# Patient Record
Sex: FEMALE
[De-identification: names, ages, dates, MRNs, and addresses within clinical notes are randomized per-mention and may not be internally consistent; named-entity substitution may affect disease eponyms.]

---

## 2019-03-14 ENCOUNTER — HOSPITAL ENCOUNTER (OUTPATIENT)
Dept: HOSPITAL 14 - H.ER | Age: 29
Setting detail: OBSERVATION
LOS: 2 days | Discharge: HOME | End: 2019-03-16
Attending: INTERNAL MEDICINE | Admitting: INTERNAL MEDICINE
Payer: COMMERCIAL

## 2019-03-14 DIAGNOSIS — F32.9: ICD-10-CM

## 2019-03-14 DIAGNOSIS — F41.9: ICD-10-CM

## 2019-03-14 DIAGNOSIS — F14.90: ICD-10-CM

## 2019-03-14 DIAGNOSIS — L03.114: Primary | ICD-10-CM

## 2019-03-14 DIAGNOSIS — L29.9: ICD-10-CM

## 2019-03-14 DIAGNOSIS — R21: ICD-10-CM

## 2019-03-14 LAB
BASOPHILS # BLD AUTO: 0 K/UL (ref 0–0.2)
BASOPHILS NFR BLD: 0.2 % (ref 0–2)
BUN SERPL-MCNC: 11 MG/DL (ref 7–17)
CALCIUM SERPL-MCNC: 9.9 MG/DL (ref 8.4–10.2)
EOSINOPHIL # BLD AUTO: 0.1 K/UL (ref 0–0.7)
EOSINOPHIL NFR BLD: 1.4 % (ref 0–4)
ERYTHROCYTE [DISTWIDTH] IN BLOOD BY AUTOMATED COUNT: 13.2 % (ref 11.5–14.5)
GFR NON-AFRICAN AMERICAN: > 60
HGB BLD-MCNC: 12.8 G/DL (ref 12–16)
LYMPHOCYTES # BLD AUTO: 1.5 K/UL (ref 1–4.3)
LYMPHOCYTES NFR BLD AUTO: 14.2 % (ref 20–40)
MCH RBC QN AUTO: 28.7 PG (ref 27–31)
MCHC RBC AUTO-ENTMCNC: 32.9 G/DL (ref 33–37)
MCV RBC AUTO: 87.4 FL (ref 81–99)
MONOCYTES # BLD: 0.7 K/UL (ref 0–0.8)
MONOCYTES NFR BLD: 6.9 % (ref 0–10)
NEUTROPHILS # BLD: 7.9 K/UL (ref 1.8–7)
NEUTROPHILS NFR BLD AUTO: 77.3 % (ref 50–75)
NRBC BLD AUTO-RTO: 0 % (ref 0–0)
PLATELET # BLD: 268 K/UL (ref 130–400)
PMV BLD AUTO: 7.1 FL (ref 7.2–11.7)
RBC # BLD AUTO: 4.46 MIL/UL (ref 3.8–5.2)
WBC # BLD AUTO: 10.2 K/UL (ref 4.8–10.8)

## 2019-03-14 PROCEDURE — 87040 BLOOD CULTURE FOR BACTERIA: CPT

## 2019-03-14 PROCEDURE — 36415 COLL VENOUS BLD VENIPUNCTURE: CPT

## 2019-03-14 PROCEDURE — 85651 RBC SED RATE NONAUTOMATED: CPT

## 2019-03-14 PROCEDURE — 86003 ALLG SPEC IGE CRUDE XTRC EA: CPT

## 2019-03-14 PROCEDURE — 81025 URINE PREGNANCY TEST: CPT

## 2019-03-14 PROCEDURE — 87070 CULTURE OTHR SPECIMN AEROBIC: CPT

## 2019-03-14 PROCEDURE — 85025 COMPLETE CBC W/AUTO DIFF WBC: CPT

## 2019-03-14 PROCEDURE — 99284 EMERGENCY DEPT VISIT MOD MDM: CPT

## 2019-03-14 PROCEDURE — 96374 THER/PROPH/DIAG INJ IV PUSH: CPT

## 2019-03-14 PROCEDURE — 82785 ASSAY OF IGE: CPT

## 2019-03-14 PROCEDURE — 80048 BASIC METABOLIC PNL TOTAL CA: CPT

## 2019-03-14 NOTE — ED PDOC
HPI: Skin/Bite Injury


Time Seen by Provider: 03/14/19 22:03


Chief Complaint (Nursing): Abnormal Skin Integrity


Chief Complaint (Provider): rash


History Per: Patient


History/Exam Limitations: no limitations


Additional Complaint(s): 


29 y/o F with hx of ADD and anxiety/depression who presents with worsening 

redness of left arm. Pt states that 2 days ago had hives on left arm that spread

to legs and abdomen. It was itchy so she began to scratch and then it began to 

blister. She developed redness so she went to urgent care today. At the urgent 

care, the blisters were scrubbed with hydrogen peroxide and cleaned with Mup

irocin antibiotic ointment. She was given a dose of Doxycycline, had redness 

marked and was advised to go to ER if redness passed the lines made on her skin.

Since then, redness has progressed and she now feels that her left arm is 

swollen. Denies eating new foods or medications, numbness, fever, chills, night 

sweats. She took a Benadryl earlier today with no effect. 





Past Medical History


Reviewed: Historical Data, Nursing Documentation, Vital Signs


Vital Signs: 





                                Last Vital Signs











Temp  98.9 F   03/14/19 22:14


 


Pulse  88   03/14/19 22:14


 


Resp  18   03/14/19 22:14


 


BP  120/85   03/14/19 22:14


 


Pulse Ox  98   03/14/19 22:14














- Medical History


PMH: Anxiety, Depression (ADD)





- Surgical History


Surgical History: No Surg Hx





- Family History


Family History: States: Unknown Family Hx





- Allergies


Allergies/Adverse Reactions: 


                                    Allergies











Allergy/AdvReac Type Severity Reaction Status Date / Time


 


No Known Allergies Allergy   Verified 03/14/19 22:14














Review of Systems


Constitutional: Negative for: Fever, Chills


Gastrointestinal: Negative for: Nausea, Vomiting





Physical Exam





- Reviewed


Nursing Documentation Reviewed: Yes


Vital Signs Reviewed: Yes





- Physical Exam


Appears: Positive for: Non-toxic


Skin: Positive for: Rash (left anterior forearm with abrasion with associated 

redness tracking up arm to past elbow medially with increased warmth and 

swelling, 2nd abrasion superior to that with associated erythema and increased 

warmth. abrasion to lower mid abdomen with mild associated erythema. Right lower

lateral leg with abrasion and mild associated erythema, Right lateral knee with 

abrasion and mild associated erythema. No purulent drainage noted from any 

wounds)


ENT: Positive for: Normal ENT Inspection


Pulses-Radial (L): 1+


Extremity: Positive for: Normal ROM (flexion and extension of left wrist and all

5 digits.), Capillary Refill (< 2sec)


Neurological/Psych: Positive for: Awake, Alert, Oriented





- Laboratory Results


Result Diagrams: 


                                 03/14/19 22:49





                                 03/14/19 22:49





- ECG


O2 Sat by Pulse Oximetry: 98





Medical Decision Making


Medical Decision Making: 


CBC, BMP, blood cultures


Urine preg


Solu-Medrol 125mg IV x 1


Benadryl 50mg IV x 1





23:30: re-assessed (30min into Solu-Medrol). Patient feels that symptoms are 

unchanged and that redness may have progressed further. Left forearm erythema 

extending into left palmar aspect of hand. No change in swelling. Labs 

unremarkable. Zosyn 3.375G IV x 1 ordered. 





23:40: Patient endorsed to [facility] Jorge pending re-evaluation.





Disposition





- Clinical Impression


Clinical Impression: 


 Cellulitis








- Patient ED Disposition


Is Patient to be Admitted: Transfer of Care (Remi Jorge)


Counseled Patient/Family Regarding: Studies Performed, Diagnosis, Need For 

Followup





- Disposition


Disposition: Transfer of Care


Disposition Time: 23:46


Condition: FAIR


Forms:  [place] (English)

## 2019-03-15 RX ADMIN — Medication SCH APPLIC: at 17:13

## 2019-03-15 RX ADMIN — METHYLPREDNISOLONE SODIUM SUCCINATE SCH MG: 40 INJECTION, POWDER, FOR SOLUTION INTRAMUSCULAR; INTRAVENOUS at 10:19

## 2019-03-15 RX ADMIN — Medication SCH APPLIC: at 20:29
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
## 2019-03-15 NOTE — ED PDOC
- Laboratory Results


Result Diagrams: 


                                 03/14/19 22:49





                                 03/14/19 22:49





- ECG


O2 Sat by Pulse Oximetry: 99





- Progress


ED Course And Treament: 





Case endorsed to writer from [facility] pending re-eval after IV Zosyn, IV 

solumedrol, IV bendaryl





2:00


Patient resting comfortably; reports redness/itching/"tightness" in left forearm

still the same.  Redness expanding outside marked areas from today's Urgent Care

visit; has not receded since PO Doxycycline dose at 18:00 and IV zosyn dose at 

23:00





Patient evaluated by Dr. Mcfadden, will admit for observation for subsequent IV 

antibiotics


Dr. Mcfadden discussed case with Dr. Madera, medical service on-call











<Greta Jorge - Last Filed: 03/15/19 02:25>





- Laboratory Results


Result Diagrams: 


                                 03/14/19 22:49





                                 03/14/19 22:49





<Efren Mcfadden - Last Filed: 03/18/19 10:47>





Disposition





- POA


Present On Arrival: None





- Disposition


Disposition: Hospitalized as Observation Patient


Disposition Time: 02:00





<Greta Jorge - Last Filed: 03/15/19 02:25>





<Efren Mcfadden - Last Filed: 03/18/19 10:47>





- Clinical Impression


Clinical Impression: 


 Cellulitis








- Disposition


Condition: FAIR
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
## 2019-03-16 VITALS — HEART RATE: 77 BPM

## 2019-03-16 VITALS
SYSTOLIC BLOOD PRESSURE: 95 MMHG | RESPIRATION RATE: 20 BRPM | OXYGEN SATURATION: 98 % | DIASTOLIC BLOOD PRESSURE: 60 MMHG | TEMPERATURE: 97.8 F

## 2019-03-16 LAB
EGG WHITE IGE QN: <0.1 KU/L (ref ?–0.1)
SHRIMP IGE QN: <0.1 KU/L (ref ?–0.1)

## 2019-03-16 RX ADMIN — METHYLPREDNISOLONE SODIUM SUCCINATE SCH MG: 40 INJECTION, POWDER, FOR SOLUTION INTRAMUSCULAR; INTRAVENOUS at 08:48

## 2019-03-16 RX ADMIN — METHYLPREDNISOLONE SODIUM SUCCINATE SCH MG: 40 INJECTION, POWDER, FOR SOLUTION INTRAMUSCULAR; INTRAVENOUS at 08:55

## 2019-03-16 RX ADMIN — Medication SCH APPLIC: at 08:49

## 2019-03-16 NOTE — CP.PCM.DIS
Provider





- Provider


Date of Admission: 


03/15/19 02:27





Attending physician: 


Boone Madera MD





Consults: 








03/15/19 09:12


Infectious Disease Consult Routine 


   Comment: 


   Consulting Provider: Nabor Olsen


   Consulting Physician: Nabor Olsen


   Reason for Consult: cellulitis











Time Spent in preparation of Discharge (in minutes): 30





Diagnosis





- Discharge Diagnosis


(1) Cellulitis


Status: Acute   





(2) Rash and nonspecific skin eruption


Status: Acute   





Hospital Course





- Lab Results


Lab Results: 


                                  Micro Results





03/14/19 22:49   Blood-Venous   Blood Culture - Preliminary


                            NO GROWTH AFTER 24 HOURS


03/15/19 18:07   Arm - Left   Gram Stain - Final





                             Most Recent Lab Values











WBC  10.2 K/uL (4.8-10.8)   03/14/19  22:49    


 


RBC  4.46 Mil/uL (3.80-5.20)   03/14/19  22:49    


 


Hgb  12.8 g/dL (12.0-16.0)   03/14/19  22:49    


 


Hct  39.0 % (34.0-47.0)   03/14/19  22:49    


 


MCV  87.4 fl (81.0-99.0)   03/14/19  22:49    


 


MCH  28.7 pg (27.0-31.0)   03/14/19  22:49    


 


MCHC  32.9 g/dL (33.0-37.0)  L  03/14/19  22:49    


 


RDW  13.2 % (11.5-14.5)   03/14/19  22:49    


 


Plt Count  268 K/uL (130-400)   03/14/19  22:49    


 


MPV  7.1 fl (7.2-11.7)  L  03/14/19  22:49    


 


Neut % (Auto)  77.3 % (50.0-75.0)  H  03/14/19  22:49    


 


Lymph % (Auto)  14.2 % (20.0-40.0)  L  03/14/19  22:49    


 


Mono % (Auto)  6.9 % (0.0-10.0)   03/14/19  22:49    


 


Eos % (Auto)  1.4 % (0.0-4.0)   03/14/19  22:49    


 


Baso % (Auto)  0.2 % (0.0-2.0)   03/14/19  22:49    


 


Neut # (Auto)  7.9 K/uL (1.8-7.0)  H  03/14/19  22:49    


 


Lymph # (Auto)  1.5 K/uL (1.0-4.3)   03/14/19  22:49    


 


Mono # (Auto)  0.7 K/uL (0.0-0.8)   03/14/19  22:49    


 


Eos # (Auto)  0.1 K/uL (0.0-0.7)   03/14/19  22:49    


 


Baso # (Auto)  0.0 K/uL (0.0-0.2)   03/14/19  22:49    


 


ESR  12 mm/hr (0-20)   03/15/19  17:30    


 


Sodium  138 mmol/l (132-148)   03/14/19  22:49    


 


Potassium  4.1 MMOL/L (3.6-5.0)   03/14/19  22:49    


 


Chloride  101 mmol/L ()   03/14/19  22:49    


 


Carbon Dioxide  25 mmol/L (22-30)   03/14/19  22:49    


 


Anion Gap  16  (10-20)   03/14/19  22:49    


 


BUN  11 mg/dl (7-17)   03/14/19  22:49    


 


Creatinine  0.6 mg/dl (0.7-1.2)  L  03/14/19  22:49    


 


Est GFR ( Amer)  > 60   03/14/19  22:49    


 


Est GFR (Non-Af Amer)  > 60   03/14/19  22:49    


 


Random Glucose  91 mg/dL ()   03/14/19  22:49    


 


Calcium  9.9 mg/dL (8.4-10.2)   03/14/19  22:49    














- Hospital Course


Hospital Course: 





SKIN RASH AND CELLULITIS IMPROVED





Discharge Exam





- Head Exam


Head Exam: ATRAUMATIC





- Eye Exam


Eye Exam: EOMI, Normal appearance, PERRL


Pupil Exam: NORMAL ACCOMODATION, PERRL





- GI/Abdominal Exam


GI & Abdominal Exam: Normal Bowel Sounds





- Rectal Exam


Rectal Exam: NORMAL INSPECTION





- Neurological Exam


Neurological exam: Alert, CN II-XII Intact, Normal Gait, Oriented x3, Reflexes 

Normal





- Psychiatric Exam


Psychiatric exam: Normal Affect, Normal Mood





- Skin


Skin Exam: Dry, Intact, Normal Color, Rash, Warm





Discharge Plan





- Follow Up Plan


Condition: FAIR


Disposition: HOME/ ROUTINE


Instructions:  Cellulitis (DC)


Additional Instructions: 


follow up with primary md 1 week 


Referrals: 


Boone Madera MD [Staff Provider] -

## 2019-03-18 LAB
AMER SYCAMORE IGE QN: <0.1 KU/L (ref ?–0.1)
D FARINAE IGE QN: <0.1 KU/L (ref ?–0.1)
DOG DANDER IGE QN: <0.1 KU/L (ref ?–0.1)
MT JUNIPER IGE QN: <0.1 KU/L (ref ?–0.1)
SILVER BIRCH IGE QN: <0.1 KU/L (ref ?–0.1)
WHITE OAK IGE QN: <0.1 KU/L (ref ?–0.1)